# Patient Record
Sex: MALE | Race: WHITE | NOT HISPANIC OR LATINO | Employment: FULL TIME | URBAN - METROPOLITAN AREA
[De-identification: names, ages, dates, MRNs, and addresses within clinical notes are randomized per-mention and may not be internally consistent; named-entity substitution may affect disease eponyms.]

---

## 2019-09-08 ENCOUNTER — HOSPITAL ENCOUNTER (EMERGENCY)
Facility: CLINIC | Age: 44
Discharge: HOME | End: 2019-09-08
Attending: EMERGENCY MEDICINE | Admitting: EMERGENCY MEDICINE

## 2019-09-08 VITALS
HEIGHT: 72 IN | HEART RATE: 54 BPM | BODY MASS INDEX: 24.38 KG/M2 | OXYGEN SATURATION: 100 % | SYSTOLIC BLOOD PRESSURE: 135 MMHG | RESPIRATION RATE: 14 BRPM | WEIGHT: 180 LBS | DIASTOLIC BLOOD PRESSURE: 82 MMHG | TEMPERATURE: 98.7 F

## 2019-09-08 DIAGNOSIS — M70.52 INFRAPATELLAR BURSITIS OF LEFT KNEE: ICD-10-CM

## 2019-09-08 DIAGNOSIS — L03.116 CELLULITIS OF LEFT LOWER EXTREMITY: ICD-10-CM

## 2019-09-08 LAB
ANION GAP SERPL CALCULATED.3IONS-SCNC: 4 MMOL/L (ref 3–14)
BASOPHILS # BLD AUTO: 0 10E9/L (ref 0–0.2)
BASOPHILS NFR BLD AUTO: 0.4 %
BUN SERPL-MCNC: 22 MG/DL (ref 7–30)
CALCIUM SERPL-MCNC: 8.5 MG/DL (ref 8.5–10.1)
CHLORIDE SERPL-SCNC: 106 MMOL/L (ref 94–109)
CO2 BLDCOV-SCNC: 29 MMOL/L (ref 21–28)
CO2 SERPL-SCNC: 31 MMOL/L (ref 20–32)
CREAT SERPL-MCNC: 0.89 MG/DL (ref 0.66–1.25)
DIFFERENTIAL METHOD BLD: ABNORMAL
EOSINOPHIL # BLD AUTO: 0.1 10E9/L (ref 0–0.7)
EOSINOPHIL NFR BLD AUTO: 1.6 %
ERYTHROCYTE [DISTWIDTH] IN BLOOD BY AUTOMATED COUNT: 12.5 % (ref 10–15)
GFR SERPL CREATININE-BSD FRML MDRD: >90 ML/MIN/{1.73_M2}
GLUCOSE SERPL-MCNC: 115 MG/DL (ref 70–99)
HCT VFR BLD AUTO: 39.8 % (ref 40–53)
HGB BLD-MCNC: 13.6 G/DL (ref 13.3–17.7)
IMM GRANULOCYTES # BLD: 0 10E9/L (ref 0–0.4)
IMM GRANULOCYTES NFR BLD: 0.1 %
LACTATE BLD-SCNC: 1 MMOL/L (ref 0.7–2.1)
LYMPHOCYTES # BLD AUTO: 1.4 10E9/L (ref 0.8–5.3)
LYMPHOCYTES NFR BLD AUTO: 16.5 %
MCH RBC QN AUTO: 31.5 PG (ref 26.5–33)
MCHC RBC AUTO-ENTMCNC: 34.2 G/DL (ref 31.5–36.5)
MCV RBC AUTO: 92 FL (ref 78–100)
MONOCYTES # BLD AUTO: 0.8 10E9/L (ref 0–1.3)
MONOCYTES NFR BLD AUTO: 9.3 %
NEUTROPHILS # BLD AUTO: 6 10E9/L (ref 1.6–8.3)
NEUTROPHILS NFR BLD AUTO: 72.1 %
NRBC # BLD AUTO: 0 10*3/UL
NRBC BLD AUTO-RTO: 0 /100
PCO2 BLDV: 54 MM HG (ref 40–50)
PH BLDV: 7.34 PH (ref 7.32–7.43)
PLATELET # BLD AUTO: 191 10E9/L (ref 150–450)
PO2 BLDV: 28 MM HG (ref 25–47)
POTASSIUM SERPL-SCNC: 4 MMOL/L (ref 3.4–5.3)
RBC # BLD AUTO: 4.32 10E12/L (ref 4.4–5.9)
SAO2 % BLDV FROM PO2: 47 %
SODIUM SERPL-SCNC: 141 MMOL/L (ref 133–144)
WBC # BLD AUTO: 8.3 10E9/L (ref 4–11)

## 2019-09-08 PROCEDURE — 82803 BLOOD GASES ANY COMBINATION: CPT

## 2019-09-08 PROCEDURE — 25000128 H RX IP 250 OP 636: Performed by: EMERGENCY MEDICINE

## 2019-09-08 PROCEDURE — 99284 EMERGENCY DEPT VISIT MOD MDM: CPT | Mod: 25

## 2019-09-08 PROCEDURE — 85025 COMPLETE CBC W/AUTO DIFF WBC: CPT | Performed by: EMERGENCY MEDICINE

## 2019-09-08 PROCEDURE — 83605 ASSAY OF LACTIC ACID: CPT

## 2019-09-08 PROCEDURE — 96374 THER/PROPH/DIAG INJ IV PUSH: CPT

## 2019-09-08 PROCEDURE — 25000132 ZZH RX MED GY IP 250 OP 250 PS 637: Performed by: EMERGENCY MEDICINE

## 2019-09-08 PROCEDURE — 80048 BASIC METABOLIC PNL TOTAL CA: CPT | Performed by: EMERGENCY MEDICINE

## 2019-09-08 RX ORDER — SULFAMETHOXAZOLE/TRIMETHOPRIM 800-160 MG
1 TABLET ORAL 2 TIMES DAILY
Qty: 14 TABLET | Refills: 0 | Status: SHIPPED | OUTPATIENT
Start: 2019-09-08 | End: 2019-09-15

## 2019-09-08 RX ORDER — SULFAMETHOXAZOLE/TRIMETHOPRIM 800-160 MG
1 TABLET ORAL ONCE
Status: COMPLETED | OUTPATIENT
Start: 2019-09-08 | End: 2019-09-08

## 2019-09-08 RX ORDER — CEFAZOLIN SODIUM 2 G/100ML
2 INJECTION, SOLUTION INTRAVENOUS ONCE
Status: COMPLETED | OUTPATIENT
Start: 2019-09-08 | End: 2019-09-08

## 2019-09-08 RX ORDER — CEPHALEXIN 500 MG/1
500 CAPSULE ORAL 4 TIMES DAILY
Qty: 28 CAPSULE | Refills: 0 | Status: SHIPPED | OUTPATIENT
Start: 2019-09-08 | End: 2019-09-15

## 2019-09-08 RX ADMIN — SULFAMETHOXAZOLE AND TRIMETHOPRIM 1 TABLET: 800; 160 TABLET ORAL at 16:26

## 2019-09-08 RX ADMIN — CEFAZOLIN SODIUM 2 G: 2 INJECTION, SOLUTION INTRAVENOUS at 16:43

## 2019-09-08 ASSESSMENT — ENCOUNTER SYMPTOMS
DIAPHORESIS: 1
JOINT SWELLING: 1
FEVER: 1
WOUND: 1

## 2019-09-08 ASSESSMENT — MIFFLIN-ST. JEOR: SCORE: 1739.47

## 2019-09-08 NOTE — ED PROVIDER NOTES
History   Chief Complaint:  Knee Pain & Swelling    HPI   Bob Beebe is an otherwise healthy 44 year old male who presents with knee pain and swelling. The patient reports that two days ago during the night he developed left knee pain, redness, and swelling. The swelling and redness have been worsening with the swelling reaching its peak yesterday. Last night, the patient attests to having a fever and night sweats. He states that today the swelling has somewhat improved however the redness has become more diffuse, prompting him to be seen at urgent care who sent him to the ED. The patient has been able to walk and run on the leg. Notably, the patient has been working on his knees recently building a sauna and sustained a wound to his left knee at that time.     Allergies:  No known drug allergies    Medications:    The patient is currently on no regular medications.    Past Medical History:    The patient does not have any past pertinent medical history.    Past Surgical History:    History reviewed. No pertinent surgical history.    Family History:    History reviewed. No pertinent family history.     Social History:  The patient presents to the ED accompanied by his wife.  Marital Status:      Review of Systems   Constitutional: Positive for diaphoresis and fever.   Musculoskeletal: Positive for joint swelling (left knee).        Positive for left knee pain   Skin: Positive for rash (redness on left knee) and wound (left knee).   All other systems reviewed and are negative.        Physical Exam     Physical Exam  Patient Vitals for the past 24 hrs:   BP Temp Temp src Pulse Heart Rate Resp SpO2 Height Weight   09/08/19 1529 135/82 98.7  F (37.1  C) Oral 54 54 14 100 % 1.829 m (6') 81.6 kg (180 lb)     General: No distress.   Head: No signs of trauma.   Mouth/Throat: Oropharynx moist.   Eyes: Conjunctivae are normal. Pupils are equal..   Neck: Normal range of motion.    Resp:No respiratory distress.    MSK: Induration around the patellar bursa just inferior to the patella (see below). Normal range of motion. No obvious deformity.  Neuro: The patient is alert and interactive. BERRY. Speech normal. GCS 15  Skin: See below. No sign of trauma noted.   Psych: normal mood and affect. behavior is normal.             Emergency Department Course   Laboratory:  CBC: WNL (WBC 8.3, HGB 13.6, )  BMP: Glucose 115 (H) o/w WNL (Creatinine 0.89)    1542: ISTAT venous gases and lactate: pH 7.34, PCO2 54 (H), PO2 28, Bicarbonate 29 (H), O2 sat 47, Lactic acid 1.0    Interventions:  1626: Bactrim 1 tablet PO  1643: Ancef 2 g IV    Emergency Department Course:  Past medical records, nursing notes, and vitals reviewed.   1600: I performed an exam of the patient and obtained history, as documented above.    IV inserted and blood drawn.    1634: I spoke with Dr. Lunsford of orthopedics regarding the patient.    I rechecked the patient. Findings and plan explained to the patient. Patient discharged home with instructions regarding supportive care, medications, and reasons to return. The importance of close follow-up was reviewed.     Impression & Plan    Medical Decision Making:  Bob Beebe is a 44 year old male who presents for evaluation of redness and swelling to his left knee.  The patient has been working on his knees lately while building a sauna in his yard. The history, physical exam and supporting data are consistent with cellulitis surrounding infrapatellar bursitis.  There do not appear at this time to be any complication of cellulitis including necrotizing fascitis, lymphangitis, lymphadenitis, abscess, osteomyelitis, sepsis, or shock.  The patient is not immunosuppressed.  I doubt at this time infection based on lack of fevers, lymphadenitis, and almost normal AROM of the joint as well as normal labs.    I consider septic arthritis but the patient has no pain within the joint with loading, no effusion and normal  ROM.  I would not tap the joint at this point as I feel the risk of introducing infection is far outweighed by benefits of obtaining fluid with my low clinical suspicion of septic joint and presence of cellulitis.  Supportive outpatient management is indicated with antibiotics. Discussed pathophysiology of bursitis and making sure no further trauma.  Close follow-up with orthopedics to ensure no progression and rapid resolution.  Area of cellulitis was outlined.  Cellulitis precautions for home.      Diagnosis:    ICD-10-CM   1. Infrapatellar bursitis of left knee M70.52   2. Cellulitis of left lower extremity L03.116       Disposition:  Discharged to home    Discharge Medications:   Details   cephALEXin (KEFLEX) 500 MG capsule Take 1 capsule (500 mg) by mouth 4 times daily for 7 days, Disp-28 capsule, R-0, Local Print   sulfamethoxazole-trimethoprim (BACTRIM DS/SEPTRA DS) 800-160 MG tablet Take 1 tablet by mouth 2 times daily for 7 days, Disp-14 tablet, R-0, Local Print       Rhys Willoughby  9/8/2019    EMERGENCY DEPARTMENT  I, Rhys Willoughby, am serving as a scribe at 4:00 PM on 9/8/2019 to document services personally performed by Jim Grullon MD based on my observations and the provider's statements to me.        Jim Grullon MD  09/08/19 1196

## 2019-09-08 NOTE — ED TRIAGE NOTES
Patient comes in from  for knee pain and swelling. Has sore on left outside aspect of knee. He states he had a tactile fever last night.

## 2019-09-08 NOTE — ED AVS SNAPSHOT
Emergency Department  [address] [place] [address]  Phone:  [phone]  Fax:  [phone]                                    Bob Beebe   MRN: [id number]    Department:   Emergency Department   Date of Visit:  9/8/2019           After Visit Summary Signature Page    I have received my discharge instructions, and my questions have been answered. I have discussed any challenges I see with this plan with the nurse or doctor.    ..........................................................................................................................................  Patient/Patient Representative Signature      ..........................................................................................................................................  Patient Representative Print Name and Relationship to Patient    ..................................................               ................................................  Date                                   Time    ..........................................................................................................................................  Reviewed by Signature/Title    ...................................................              ..............................................  Date                                               Time          22EPIC Rev 08/18

## 2020-02-06 ENCOUNTER — HOSPITAL ENCOUNTER (OUTPATIENT)
Dept: LAB | Facility: CLINIC | Age: 45
Discharge: HOME | End: 2020-02-06
Attending: EMERGENCY MEDICINE | Admitting: EMERGENCY MEDICINE
Payer: COMMERCIAL

## 2020-02-06 DIAGNOSIS — M25.562 LEFT KNEE PAIN: ICD-10-CM

## 2020-02-06 DIAGNOSIS — Z00.00 HEALTH MAINTENANCE EXAMINATION: Primary | ICD-10-CM

## 2020-02-06 LAB
CRP SERPL-MCNC: <2.9 MG/L (ref 0–8)
ERYTHROCYTE [DISTWIDTH] IN BLOOD BY AUTOMATED COUNT: 12.7 % (ref 10–15)
ERYTHROCYTE [SEDIMENTATION RATE] IN BLOOD BY WESTERGREN METHOD: 6 MM/H (ref 0–15)
HCT VFR BLD AUTO: 43.2 % (ref 40–53)
HGB BLD-MCNC: 14.5 G/DL (ref 13.3–17.7)
MCH RBC QN AUTO: 31 PG (ref 26.5–33)
MCHC RBC AUTO-ENTMCNC: 33.6 G/DL (ref 31.5–36.5)
MCV RBC AUTO: 92 FL (ref 78–100)
PLATELET # BLD AUTO: 182 10E9/L (ref 150–450)
RBC # BLD AUTO: 4.68 10E12/L (ref 4.4–5.9)
WBC # BLD AUTO: 3.5 10E9/L (ref 4–11)

## 2020-02-06 PROCEDURE — 85652 RBC SED RATE AUTOMATED: CPT | Performed by: EMERGENCY MEDICINE

## 2020-02-06 PROCEDURE — 85027 COMPLETE CBC AUTOMATED: CPT | Performed by: EMERGENCY MEDICINE

## 2020-02-06 PROCEDURE — 86140 C-REACTIVE PROTEIN: CPT | Performed by: EMERGENCY MEDICINE

## 2020-02-06 PROCEDURE — 36415 COLL VENOUS BLD VENIPUNCTURE: CPT | Performed by: EMERGENCY MEDICINE

## 2021-01-20 ENCOUNTER — AMBULATORY - HEALTHEAST (OUTPATIENT)
Dept: NURSING | Facility: CLINIC | Age: 46
End: 2021-01-20

## 2021-02-12 ENCOUNTER — AMBULATORY - HEALTHEAST (OUTPATIENT)
Dept: NURSING | Facility: CLINIC | Age: 46
End: 2021-02-12

## 2021-08-15 ENCOUNTER — HEALTH MAINTENANCE LETTER (OUTPATIENT)
Age: 46
End: 2021-08-15

## 2021-10-10 ENCOUNTER — HEALTH MAINTENANCE LETTER (OUTPATIENT)
Age: 46
End: 2021-10-10

## 2022-09-18 ENCOUNTER — HEALTH MAINTENANCE LETTER (OUTPATIENT)
Age: 47
End: 2022-09-18

## 2023-10-08 ENCOUNTER — HEALTH MAINTENANCE LETTER (OUTPATIENT)
Age: 48
End: 2023-10-08

## 2024-09-01 ENCOUNTER — TRANSFERRED RECORDS (OUTPATIENT)
Dept: MULTI SPECIALTY CLINIC | Facility: CLINIC | Age: 49
End: 2024-09-01

## 2025-03-16 SDOH — HEALTH STABILITY: PHYSICAL HEALTH: ON AVERAGE, HOW MANY MINUTES DO YOU ENGAGE IN EXERCISE AT THIS LEVEL?: 30 MIN

## 2025-03-16 SDOH — HEALTH STABILITY: PHYSICAL HEALTH: ON AVERAGE, HOW MANY DAYS PER WEEK DO YOU ENGAGE IN MODERATE TO STRENUOUS EXERCISE (LIKE A BRISK WALK)?: 6 DAYS

## 2025-03-16 ASSESSMENT — SOCIAL DETERMINANTS OF HEALTH (SDOH): HOW OFTEN DO YOU GET TOGETHER WITH FRIENDS OR RELATIVES?: TWICE A WEEK

## 2025-03-21 ENCOUNTER — OFFICE VISIT (OUTPATIENT)
Dept: INTERNAL MEDICINE | Facility: CLINIC | Age: 50
End: 2025-03-21
Payer: COMMERCIAL

## 2025-03-21 VITALS
HEIGHT: 71 IN | DIASTOLIC BLOOD PRESSURE: 77 MMHG | SYSTOLIC BLOOD PRESSURE: 131 MMHG | OXYGEN SATURATION: 98 % | HEART RATE: 55 BPM | BODY MASS INDEX: 27.61 KG/M2 | RESPIRATION RATE: 14 BRPM | TEMPERATURE: 99.1 F | WEIGHT: 197.2 LBS

## 2025-03-21 DIAGNOSIS — Z78.9 HEPATITIS B VACCINATION STATUS UNKNOWN: ICD-10-CM

## 2025-03-21 DIAGNOSIS — Z00.00 ROUTINE GENERAL MEDICAL EXAMINATION AT A HEALTH CARE FACILITY: Primary | ICD-10-CM

## 2025-03-21 DIAGNOSIS — Z12.5 SCREENING FOR PROSTATE CANCER: ICD-10-CM

## 2025-03-21 DIAGNOSIS — Z12.11 SCREEN FOR COLON CANCER: ICD-10-CM

## 2025-03-21 DIAGNOSIS — R73.03 PREDIABETES: ICD-10-CM

## 2025-03-21 DIAGNOSIS — E55.9 VITAMIN D DEFICIENCY: ICD-10-CM

## 2025-03-21 DIAGNOSIS — G43.009 MIGRAINE WITHOUT AURA AND WITHOUT STATUS MIGRAINOSUS, NOT INTRACTABLE: ICD-10-CM

## 2025-03-21 PROBLEM — G43.909 MIGRAINE: Status: ACTIVE | Noted: 2018-07-22

## 2025-03-21 LAB
ERYTHROCYTE [DISTWIDTH] IN BLOOD BY AUTOMATED COUNT: 11.9 % (ref 10–15)
EST. AVERAGE GLUCOSE BLD GHB EST-MCNC: 105 MG/DL
HBA1C MFR BLD: 5.3 % (ref 0–5.6)
HCT VFR BLD AUTO: 43.4 % (ref 40–53)
HGB BLD-MCNC: 15 G/DL (ref 13.3–17.7)
MCH RBC QN AUTO: 31.3 PG (ref 26.5–33)
MCHC RBC AUTO-ENTMCNC: 34.6 G/DL (ref 31.5–36.5)
MCV RBC AUTO: 91 FL (ref 78–100)
PLATELET # BLD AUTO: 228 10E3/UL (ref 150–450)
RBC # BLD AUTO: 4.79 10E6/UL (ref 4.4–5.9)
WBC # BLD AUTO: 5.4 10E3/UL (ref 4–11)

## 2025-03-21 PROCEDURE — 90677 PCV20 VACCINE IM: CPT | Performed by: INTERNAL MEDICINE

## 2025-03-21 PROCEDURE — 36415 COLL VENOUS BLD VENIPUNCTURE: CPT | Performed by: INTERNAL MEDICINE

## 2025-03-21 PROCEDURE — 85027 COMPLETE CBC AUTOMATED: CPT | Performed by: INTERNAL MEDICINE

## 2025-03-21 PROCEDURE — 83036 HEMOGLOBIN GLYCOSYLATED A1C: CPT | Performed by: INTERNAL MEDICINE

## 2025-03-21 PROCEDURE — 82306 VITAMIN D 25 HYDROXY: CPT | Performed by: INTERNAL MEDICINE

## 2025-03-21 PROCEDURE — 80061 LIPID PANEL: CPT | Performed by: INTERNAL MEDICINE

## 2025-03-21 PROCEDURE — 99386 PREV VISIT NEW AGE 40-64: CPT | Mod: 25 | Performed by: INTERNAL MEDICINE

## 2025-03-21 PROCEDURE — 90472 IMMUNIZATION ADMIN EACH ADD: CPT | Performed by: INTERNAL MEDICINE

## 2025-03-21 PROCEDURE — 90673 RIV3 VACCINE NO PRESERV IM: CPT | Performed by: INTERNAL MEDICINE

## 2025-03-21 PROCEDURE — 84443 ASSAY THYROID STIM HORMONE: CPT | Performed by: INTERNAL MEDICINE

## 2025-03-21 PROCEDURE — G0103 PSA SCREENING: HCPCS | Performed by: INTERNAL MEDICINE

## 2025-03-21 PROCEDURE — 90471 IMMUNIZATION ADMIN: CPT | Performed by: INTERNAL MEDICINE

## 2025-03-21 PROCEDURE — 86706 HEP B SURFACE ANTIBODY: CPT | Performed by: INTERNAL MEDICINE

## 2025-03-21 PROCEDURE — 80053 COMPREHEN METABOLIC PANEL: CPT | Performed by: INTERNAL MEDICINE

## 2025-03-21 ASSESSMENT — PAIN SCALES - GENERAL: PAINLEVEL_OUTOF10: NO PAIN (0)

## 2025-03-21 NOTE — PATIENT INSTRUCTIONS
Patient Education   Preventive Care Advice   This is general advice given by our system to help you stay healthy. However, your care team may have specific advice just for you. Please talk to your care team about your preventive care needs.  Nutrition  Eat 5 or more servings of fruits and vegetables each day.  Try wheat bread, brown rice and whole grain pasta (instead of white bread, rice, and pasta).  Get enough calcium and vitamin D. Check the label on foods and aim for 100% of the RDA (recommended daily allowance).  Lifestyle  Exercise at least 150 minutes each week  (30 minutes a day, 5 days a week).  Do muscle strengthening activities 2 days a week. These help control your weight and prevent disease.  No smoking.  Wear sunscreen to prevent skin cancer.  Have a dental exam and cleaning every 6 months.  Yearly exams  See your health care team every year to talk about:  Any changes in your health.  Any medicines your care team has prescribed.  Preventive care, family planning, and ways to prevent chronic diseases.  Shots (vaccines)   HPV shots (up to age 26), if you've never had them before.  Hepatitis B shots (up to age 59), if you've never had them before.  COVID-19 shot: Get this shot when it's due.  Flu shot: Get a flu shot every year.  Tetanus shot: Get a tetanus shot every 10 years.  Pneumococcal, hepatitis A, and RSV shots: Ask your care team if you need these based on your risk.  Shingles shot (for age 50 and up)  General health tests  Diabetes screening:  Starting at age 35, Get screened for diabetes at least every 3 years.  If you are younger than age 35, ask your care team if you should be screened for diabetes.  Cholesterol test: At age 39, start having a cholesterol test every 5 years, or more often if advised.  Bone density scan (DEXA): At age 50, ask your care team if you should have this scan for osteoporosis (brittle bones).  Hepatitis C: Get tested at least once in your life.  STIs (sexually  transmitted infections)  Before age 24: Ask your care team if you should be screened for STIs.  After age 24: Get screened for STIs if you're at risk. You are at risk for STIs (including HIV) if:  You are sexually active with more than one person.  You don't use condoms every time.  You or a partner was diagnosed with a sexually transmitted infection.  If you are at risk for HIV, ask about PrEP medicine to prevent HIV.  Get tested for HIV at least once in your life, whether you are at risk for HIV or not.  Cancer screening tests  Cervical cancer screening: If you have a cervix, begin getting regular cervical cancer screening tests starting at age 21.  Breast cancer scan (mammogram): If you've ever had breasts, begin having regular mammograms starting at age 40. This is a scan to check for breast cancer.  Colon cancer screening: It is important to start screening for colon cancer at age 45.  Have a colonoscopy test every 10 years (or more often if you're at risk) Or, ask your provider about stool tests like a FIT test every year or Cologuard test every 3 years.  To learn more about your testing options, visit:   .  For help making a decision, visit:   https://bit.ly/qi17966.  Prostate cancer screening test: If you have a prostate, ask your care team if a prostate cancer screening test (PSA) at age 55 is right for you.  Lung cancer screening: If you are a current or former smoker ages 50 to 80, ask your care team if ongoing lung cancer screenings are right for you.  For informational purposes only. Not to replace the advice of your health care provider. Copyright   2023 [employer]. All rights reserved. Clinically reviewed by the [facility] Transitions Program. [employer] [id number] - REV 01/24.

## 2025-03-21 NOTE — NURSING NOTE
Prior to immunization administration, verified patients identity using patient s name and date of birth. Please see Immunization Activity for additional information.     Screening Questionnaire for Adult Immunization    Are you sick today?   No   Do you have allergies to medications, food, a vaccine component or latex?   No   Have you ever had a serious reaction after receiving a vaccination?   No   Do you have a long-term health problem with heart, lung, kidney, or metabolic disease (e.g., diabetes), asthma, a blood disorder, no spleen, complement component deficiency, a cochlear implant, or a spinal fluid leak?  Are you on long-term aspirin therapy?   No   Do you have cancer, leukemia, HIV/AIDS, or any other immune system problem?   No   Do you have a parent, brother, or sister with an immune system problem?   Yes   In the past 3 months, have you taken medications that affect  your immune system, such as prednisone, other steroids, or anticancer drugs; drugs for the treatment of rheumatoid arthritis, Crohn s disease, or psoriasis; or have you had radiation treatments?   No   Have you had a seizure, or a brain or other nervous system problem?   No   During the past year, have you received a transfusion of blood or blood    products, or been given immune (gamma) globulin or antiviral drug?   No   For women: Are you pregnant or is there a chance you could become       pregnant during the next month?   No   Have you received any vaccinations in the past 4 weeks?   No     Immunization questionnaire, please see above.      Patient instructed to remain in clinic for 15 minutes afterwards, and to report any adverse reactions.     Screening performed by Sharonda Villalpando CMA on 3/21/2025 at 2:19 PM.

## 2025-03-21 NOTE — PROGRESS NOTES
"Preventive Care Visit  [facility]  Kody Mcclendon MD, Internal Medicine  Mar 21, 2025      Assessment & Plan     Routine general medical examination at a health care facility  Screening labs ordered    Vitamin D deficiency  Lab ordered    Screen for colon cancer  Not due yet, patient will get his report from his last colonoscopy    Prediabetes  A1c ordered    Migraine without aura and without status migrainosus, not intractable  Stable    Screening for prostate cancer  PSA ordered    Hepatitis B vaccination status unknown  Check antibody titer       Patient has been advised of split billing requirements and indicates understanding: Yes        BMI  Estimated body mass index is 27.5 kg/m  as calculated from the following:    Height as of this encounter: 1.803 m (5' 11\").    Weight as of this encounter: 89.4 kg (197 lb 3.2 oz).   Weight management plan: Discussed healthy diet and exercise guidelines    Counseling  Appropriate preventive services were addressed with this patient via screening, questionnaire, or discussion as appropriate for fall prevention, nutrition, physical activity, Tobacco-use cessation, social engagement, weight loss and cognition.  Checklist reviewing preventive services available has been given to the patient.  Reviewed patient's diet, addressing concerns and/or questions.           Melinda Rojas is a 49 year old, presenting for the following:  Physical         Via the Health Maintenance questionnaire, the patient has reported the following services have been completed -Colonscopy: Don t recall 2024-09-01, this information has been sent to the abstraction team.    HPI       Denies any problems today.    Has a history of migraines in the past but they have been very minimal now.    Does not use any medications.    Had a colonoscopy 1 year ago in [place] and it was normal.    Denies family history of cancers.    His father has a diagnosis of valvular heart " problems.    Patient is wondering if he needs to get a stress test later.  He does not have any symptoms of chest pain or shortness of breath.        Advance Care Planning  Patient does not have a Health Care Directive:       3/16/2025   General Health   How would you rate your overall physical health? Excellent   Feel stress (tense, anxious, or unable to sleep) Not at all         3/16/2025   Nutrition   Three or more servings of calcium each day? Yes   Diet: Regular (no restrictions)   How many servings of fruit and vegetables per day? 4 or more   How many sweetened beverages each day? 0-1         3/16/2025   Exercise   Days per week of moderate/strenous exercise 6 days   Average minutes spent exercising at this level 30 min         3/16/2025   Social Factors   Frequency of gathering with friends or relatives Twice a week   Worry food won't last until get money to buy more No   Food not last or not have enough money for food? No   Do you have housing? (Housing is defined as stable permanent housing and does not include staying ouside in a car, in a tent, in an abandoned building, in an overnight shelter, or couch-surfing.) Yes   Are you worried about losing your housing? No   Lack of transportation? No   Unable to get utilities (heat,electricity)? No         3/16/2025   Fall Risk   Fallen 2 or more times in the past year? No   Trouble with walking or balance? No          3/16/2025   Dental   Dentist two times every year? Yes           Today's PHQ-2 Score:       3/21/2025     1:34 PM   PHQ-2 ( 1999 [employer])   Q1: Little interest or pleasure in doing things 0   Q2: Feeling down, depressed or hopeless 0   PHQ-2 Score 0    Q1: Little interest or pleasure in doing things Not at all   Q2: Feeling down, depressed or hopeless Not at all   PHQ-2 Score 0       Patient-reported           3/16/2025   Substance Use   Alcohol more than 3/day or more than 7/wk No   Do you use any other substances recreationally? No     Social  "History     Tobacco Use    Smoking status: Never    Smokeless tobacco: Never   Substance Use Topics    Alcohol use: Never    Drug use: Never             3/16/2025   One time HIV Screening   Previous HIV test? No         3/16/2025   STI Screening   New sexual partner(s) since last STI/HIV test? No   ASCVD Risk   The ASCVD Risk score (Junior KIM, et al., 2019) failed to calculate for the following reasons:    Cannot find a previous HDL lab    Cannot find a previous total cholesterol lab    The BP treatment status is invalid            3/16/2025   Contraception/Family Planning   Questions about contraception or family planning No        Reviewed and updated as needed this visit by Provider                    History reviewed. No pertinent past medical history.      Review of Systems  Constitutional, HEENT, cardiovascular, pulmonary, GI, , musculoskeletal, neuro, skin, endocrine and psych systems are negative, except as otherwise noted.     Objective    Exam  /77 (BP Location: Right arm, Patient Position: Sitting, Cuff Size: Adult Large)   Pulse 55   Temp 99.1  F (37.3  C) (Temporal)   Resp 14   Ht 1.803 m (5' 11\")   Wt 89.4 kg (197 lb 3.2 oz)   SpO2 98%   BMI 27.50 kg/m     Estimated body mass index is 27.5 kg/m  as calculated from the following:    Height as of this encounter: 1.803 m (5' 11\").    Weight as of this encounter: 89.4 kg (197 lb 3.2 oz).    Physical Exam  GENERAL: alert and no distress  EYES: Eyes grossly normal to inspection, PERRL and conjunctivae and sclerae normal  HENT: ear canals and TM's normal, nose and mouth without ulcers or lesions  NECK: no adenopathy, no asymmetry, masses, or scars  RESP: lungs clear to auscultation - no rales, rhonchi or wheezes  CV: regular rate and rhythm, normal S1 S2, no S3 or S4, no murmur, click or rub, no peripheral edema  ABDOMEN: soft, nontender, no hepatosplenomegaly, no masses and bowel sounds normal  MS: no gross musculoskeletal defects " noted, no edema  SKIN: no suspicious lesions or rashes  NEURO: Normal strength and tone, mentation intact and speech normal  PSYCH: mentation appears normal, affect normal/bright        Signed Electronically by: Kody Mcclendon MD

## 2025-03-22 LAB
ALBUMIN SERPL BCG-MCNC: 4.7 G/DL (ref 3.5–5.2)
ALP SERPL-CCNC: 89 U/L (ref 40–150)
ALT SERPL W P-5'-P-CCNC: 21 U/L (ref 0–70)
ANION GAP SERPL CALCULATED.3IONS-SCNC: 11 MMOL/L (ref 7–15)
AST SERPL W P-5'-P-CCNC: 29 U/L (ref 0–45)
BILIRUB SERPL-MCNC: 0.7 MG/DL
BUN SERPL-MCNC: 20.6 MG/DL (ref 6–20)
CALCIUM SERPL-MCNC: 9.7 MG/DL (ref 8.8–10.4)
CHLORIDE SERPL-SCNC: 101 MMOL/L (ref 98–107)
CHOLEST SERPL-MCNC: 229 MG/DL
CREAT SERPL-MCNC: 1.19 MG/DL (ref 0.67–1.17)
EGFRCR SERPLBLD CKD-EPI 2021: 74 ML/MIN/1.73M2
FASTING STATUS PATIENT QL REPORTED: YES
FASTING STATUS PATIENT QL REPORTED: YES
GLUCOSE SERPL-MCNC: 90 MG/DL (ref 70–99)
HBV SURFACE AB SERPL IA-ACNC: 813 M[IU]/ML
HBV SURFACE AB SERPL IA-ACNC: REACTIVE M[IU]/ML
HCO3 SERPL-SCNC: 28 MMOL/L (ref 22–29)
HDLC SERPL-MCNC: 82 MG/DL
LDLC SERPL CALC-MCNC: 137 MG/DL
NONHDLC SERPL-MCNC: 147 MG/DL
POTASSIUM SERPL-SCNC: 4.8 MMOL/L (ref 3.4–5.3)
PROT SERPL-MCNC: 7.2 G/DL (ref 6.4–8.3)
PSA SERPL DL<=0.01 NG/ML-MCNC: 0.25 NG/ML (ref 0–3.5)
SODIUM SERPL-SCNC: 140 MMOL/L (ref 135–145)
TRIGL SERPL-MCNC: 48 MG/DL
TSH SERPL DL<=0.005 MIU/L-ACNC: 2.41 UIU/ML (ref 0.3–4.2)
VIT D+METAB SERPL-MCNC: 75 NG/ML (ref 20–50)

## 2025-06-24 ENCOUNTER — OFFICE VISIT (OUTPATIENT)
Dept: INTERNAL MEDICINE | Facility: CLINIC | Age: 50
End: 2025-06-24
Payer: COMMERCIAL

## 2025-06-24 VITALS
DIASTOLIC BLOOD PRESSURE: 60 MMHG | OXYGEN SATURATION: 99 % | SYSTOLIC BLOOD PRESSURE: 132 MMHG | WEIGHT: 197.5 LBS | HEART RATE: 54 BPM | RESPIRATION RATE: 16 BRPM | BODY MASS INDEX: 27.55 KG/M2 | TEMPERATURE: 97.2 F

## 2025-06-24 DIAGNOSIS — S16.1XXA STRAIN OF NECK MUSCLE, INITIAL ENCOUNTER: Primary | ICD-10-CM

## 2025-06-24 PROCEDURE — 99213 OFFICE O/P EST LOW 20 MIN: CPT | Performed by: INTERNAL MEDICINE

## 2025-06-24 NOTE — PROGRESS NOTES
Assessment & Plan     Strain of neck muscle, initial encounter  Advised ice application, OTC topical diclofenac as needed.                  The 10-year ASCVD risk score (Junior KIM, et al., 2019) is: 2.9%    Values used to calculate the score:      Age: 50 years      Sex: Male      Is Non- : No      Diabetic: No      Tobacco smoker: No      Systolic Blood Pressure: 132 mmHg      Is BP treated: No      HDL Cholesterol: 82 mg/dL      Total Cholesterol: 229 mg/dL      Melinda Rojas is a 50 year old, presenting for the following health issues:  Musculoskeletal Problem        6/24/2025     2:09 PM   Additional Questions   Roomed by Bhupinder LATHAM CMA   Accompanied by self     Musculoskeletal Problem    History of Present Illness       Reason for visit:  L neck tightening in sternocleidomastoid or carotid artery intermittent  Symptom onset:  1-2 weeks ago  Symptoms include:  Intermitent tightening  Symptom intensity:  Mild  Symptom progression:  Staying the same  Had these symptoms before:  No  What makes it worse:  No  What makes it better:  Activity   He is taking medications regularly.          Has a sensation of tightness over the left side of his neck over the sternocleidomastoid muscle.  It is not painful and he is rating this sensation as 3 out of 10.  It is constant and does get worse with moving the head and neck.  Not able to recall any unusual exertion or injury but he does work in fitness industry.  Also could be having bad posture while sleeping at night.    He is concerned it could be a vascular issue.    Review of Systems  Constitutional, HEENT, cardiovascular, pulmonary, gi and gu systems are negative, except as otherwise noted.      Objective    /60   Pulse 54   Temp 97.2  F (36.2  C) (Temporal)   Resp 16   Wt 89.6 kg (197 lb 8 oz)   SpO2 99%   BMI 27.55 kg/m    Body mass index is 27.55 kg/m .  Physical Exam   GENERAL: alert and no distress  NECK: no adenopathy, no  asymmetry, masses, or scars  Slight thickening over the lower sternocleidomastoid muscle on the left side of the neck noted without any tenderness or redness  No bruits auscultated  RESP: lungs clear to auscultation - no rales, rhonchi or wheezes              Signed Electronically by: Kody Mcclendon MD